# Patient Record
Sex: FEMALE | NOT HISPANIC OR LATINO | URBAN - METROPOLITAN AREA
[De-identification: names, ages, dates, MRNs, and addresses within clinical notes are randomized per-mention and may not be internally consistent; named-entity substitution may affect disease eponyms.]

---

## 2017-10-21 ENCOUNTER — IMPORTED ENCOUNTER (OUTPATIENT)
Dept: URBAN - METROPOLITAN AREA CLINIC 1 | Facility: CLINIC | Age: 47
End: 2017-10-21

## 2017-10-21 PROBLEM — H52.4: Noted: 2017-10-21

## 2017-10-21 PROBLEM — H52.13: Noted: 2017-10-21

## 2017-10-21 PROCEDURE — S0620 ROUTINE OPHTHALMOLOGICAL EXA: HCPCS

## 2017-10-21 NOTE — PATIENT DISCUSSION
1. Myopia: Rx was given for correction if indicated and requested. 2. Presbyopia: Rx was given for corrective spectacles if indicated. 3.  (Gsuspect OU (0.8 OU/ () FHX- G-mother)- Normal IOP OU. Baseline testing done in past- normal per pt. Will do our own baseline testing in 6 months w/ a HVF an OCT. )4. Return for an appointment for a 30/OCT/HVF in 6 months with Dr. Josy Blancas.

## 2018-04-27 ENCOUNTER — IMPORTED ENCOUNTER (OUTPATIENT)
Dept: URBAN - METROPOLITAN AREA CLINIC 1 | Facility: CLINIC | Age: 48
End: 2018-04-27

## 2018-04-27 PROBLEM — H25.13: Noted: 2018-04-27

## 2018-04-27 PROBLEM — H40.023: Noted: 2018-04-27

## 2018-04-27 PROCEDURE — 92014 COMPRE OPH EXAM EST PT 1/>: CPT

## 2018-04-27 PROCEDURE — 92083 EXTENDED VISUAL FIELD XM: CPT

## 2018-04-27 PROCEDURE — 92133 CPTRZD OPH DX IMG PST SGM ON: CPT

## 2018-04-27 NOTE — PATIENT DISCUSSION
1.  Glaucoma Suspect OU (0.8 OU): Stable IOP OU. Normal HVF OU. Minimal thinning by OCT OU. **Treat w/ any future progression. Patient is considered high risk. Condition was discussed with patient and patient understands. Will continue to monitor patient for any progression in condition. Patient was advised to call us with any problems questions or concerns. 2.  Cataract OU: Observe for now without intervention. The patient was advised to contact us if any change or worsening of vision3. Return as scheduled in October with Dr. Laura Hui. 4. Return for an appointment for a 30/OCT in 1 year with Dr. Laura Hui.

## 2018-04-27 NOTE — PATIENT DISCUSSION
Cataract OU: Observe for now without intervention.  The patient was advised to contact us if any change or worsening of vision

## 2018-10-20 ENCOUNTER — IMPORTED ENCOUNTER (OUTPATIENT)
Dept: URBAN - METROPOLITAN AREA CLINIC 1 | Facility: CLINIC | Age: 48
End: 2018-10-20

## 2018-10-20 PROBLEM — H52.4: Noted: 2018-10-20

## 2018-10-20 PROBLEM — H52.13: Noted: 2018-10-20

## 2018-10-20 PROCEDURE — S0621 ROUTINE OPHTHALMOLOGICAL EXA: HCPCS

## 2018-10-20 NOTE — PATIENT DISCUSSION
1. Myopia: Rx was given for correction if indicated and requested. 2. Presbyopia: Rx was given for corrective spectacles if indicated. 3.  (Glaucoma Suspect OU (0.8 OU): Stable IOP OU. HVF normal OU in past. Minimal thinning by OCT OU in past. **Treat w/ any future progression. Patient is considered high risk. )4. (Cataract OU: Observe for now without intervention. The patient was advised to contact us if any change or worsening of vision)5.  (VERENICE w/ PEK OU- Start Tears OU QID routinely)6. Return for an appointment for 40cc in 1 year with Dr. Nemesio Balderas.

## 2019-04-26 ENCOUNTER — IMPORTED ENCOUNTER (OUTPATIENT)
Dept: URBAN - METROPOLITAN AREA CLINIC 1 | Facility: CLINIC | Age: 49
End: 2019-04-26

## 2019-04-26 PROBLEM — H16.143: Noted: 2019-04-26

## 2019-04-26 PROBLEM — H04.123: Noted: 2019-04-26

## 2019-04-26 PROBLEM — H25.13: Noted: 2019-04-26

## 2019-04-26 PROBLEM — H40.023: Noted: 2019-04-26

## 2019-04-26 PROCEDURE — 92014 COMPRE OPH EXAM EST PT 1/>: CPT

## 2019-04-26 PROCEDURE — 92133 CPTRZD OPH DX IMG PST SGM ON: CPT

## 2019-04-26 NOTE — PATIENT DISCUSSION
1.  Glaucoma Suspect OU (CD: 0.80 OU) -- IOP stable today OU. Positive Family h/o Glaucoma. OCT today shows stable minimal thinning OU. **Treat w/ any future progression** Patient is considered high risk. Condition was discussed with patient and patient understands. Will continue to monitor patient for any progression in condition. Patient was advised to call us with any problems questions or concerns. 2.  VERENICE w/ PEK OU -- Recommend continue the frequent use of OTC AT's BID-QID OU Routinely. 3.  Nuclear Cataracts OU -- Observe for now without intervention. The patient was advised to contact us if any change or worsening of vision. Return for an appointment in 1 YR for a 27 / OCT OU with Dr. Jolynn Hutton. Patient defers the refraction at today's visit (done under [employer] / [employer]). Return as scheduled in October 2019 for 40 / 1795 Dr Shree Murdock [facility] appointment with Dr. Jolynn Hutton.

## 2019-10-19 ENCOUNTER — IMPORTED ENCOUNTER (OUTPATIENT)
Dept: URBAN - METROPOLITAN AREA CLINIC 1 | Facility: CLINIC | Age: 49
End: 2019-10-19

## 2019-10-19 PROBLEM — H52.13: Noted: 2019-10-19

## 2019-10-19 PROCEDURE — S0621 ROUTINE OPHTHALMOLOGICAL EXA: HCPCS

## 2019-10-19 NOTE — PATIENT DISCUSSION
1. Myopia OU- Rx for glasses & Cls given 2. Glaucoma Suspect OU (CD: 0.80 OU) -- Fm Hx F/u as scheduled for baseline w/u. 3.  VERENICE w/ PEK OU -- Cont AT's BID-QID OU Routinely. 4.  Nuclear Cataracts OU -- ObserveReturn for an appointment in as scheduled with Dr. Ernesto Omer. Return for an appointment in 1 yr 40/cc with Dr. Ernesto Omer.

## 2020-09-04 ENCOUNTER — IMPORTED ENCOUNTER (OUTPATIENT)
Dept: URBAN - METROPOLITAN AREA CLINIC 1 | Facility: CLINIC | Age: 50
End: 2020-09-04

## 2020-09-04 PROBLEM — H25.13: Noted: 2020-09-04

## 2020-09-04 PROBLEM — H40.023: Noted: 2020-09-04

## 2020-09-04 PROBLEM — H04.123: Noted: 2020-09-04

## 2020-09-04 PROBLEM — H16.143: Noted: 2020-09-04

## 2020-09-04 PROCEDURE — 92014 COMPRE OPH EXAM EST PT 1/>: CPT

## 2020-09-04 PROCEDURE — 92133 CPTRZD OPH DX IMG PST SGM ON: CPT

## 2020-09-04 NOTE — PATIENT DISCUSSION
1.  Glaucoma Suspect OU -- (CD 0.8 OU) IOP stable. OCT without progression OU. () family Hx (Maternal grandmother). Patient is considered high risk. Condition was discussed with patient and patient understands. Will continue to monitor patient for any progression in condition. Patient was advised to call us with any problems questions or concerns. 2.  VERENICE w/ PEK OU -- Cont AT's BID-QID OU Routinely. 3.  Cataract OU --  Observe for now without intervention. The patient was advised to contact us if any change or worsening of visionReturn for an appointment in Next Available for a 40/cc with Dr. Keri William. Return for an appointment in August for a 30/OCT with Dr. Sony Blandon.

## 2020-11-07 ENCOUNTER — IMPORTED ENCOUNTER (OUTPATIENT)
Dept: URBAN - METROPOLITAN AREA CLINIC 1 | Facility: CLINIC | Age: 50
End: 2020-11-07

## 2020-11-07 PROBLEM — H52.4: Noted: 2020-11-07

## 2020-11-07 PROBLEM — H52.223: Noted: 2020-11-07

## 2020-11-07 PROBLEM — H52.13: Noted: 2020-11-07

## 2020-11-07 PROCEDURE — S0621 ROUTINE OPHTHALMOLOGICAL EXA: HCPCS

## 2020-11-07 NOTE — PATIENT DISCUSSION
1. Myopia w/ Astigmatism OS -- Rx was given for correction if indicated and requested. 2. Presbyopia 3. Nuclear Cataracts OU -- Observe. 4.  VERENICE w/ PEK OU -- Cont the use of ATs BID OU routinely. 5.  Glaucoma Suspect OU -- (C/D: 0.80 OU) IOP stable today. Finalized CTL Rx today and given to patient. Return for an appointment for Return as scheduled with Dr. Bella Reyes. Return for an appointment in 1 year 40/cc with Dr. Kenyatta Swartz.

## 2021-08-27 ENCOUNTER — IMPORTED ENCOUNTER (OUTPATIENT)
Dept: URBAN - METROPOLITAN AREA CLINIC 1 | Facility: CLINIC | Age: 51
End: 2021-08-27

## 2021-08-27 PROBLEM — H25.13: Noted: 2021-08-27

## 2021-08-27 PROBLEM — H40.023: Noted: 2021-08-27

## 2021-08-27 PROBLEM — H16.143: Noted: 2021-08-27

## 2021-08-27 PROBLEM — H04.123: Noted: 2021-08-27

## 2021-08-27 PROCEDURE — 92014 COMPRE OPH EXAM EST PT 1/>: CPT

## 2021-08-27 PROCEDURE — 92133 CPTRZD OPH DX IMG PST SGM ON: CPT

## 2021-08-27 NOTE — PATIENT DISCUSSION
1.  Glaucoma Suspect OU :(C/D 0.8 OU) Patient is considered high risk. OCT stable. FH. Condition was discussed with patient and patient understands. Will continue to monitor patient for any progression in condition. Patient was advised to call us with any problems questions or concerns. 2.  VERENICE w/ PEK OU-The use/continuation of artificial tears were recommended. 3.  Cataract OU: Observe for now without intervention. The patient was advised to contact us if any change or worsening of vision4. Return for an appointment in 1 year for 30 OCT VF with Dr. Minnie Tyler.

## 2021-11-12 ENCOUNTER — IMPORTED ENCOUNTER (OUTPATIENT)
Dept: URBAN - METROPOLITAN AREA CLINIC 1 | Facility: CLINIC | Age: 51
End: 2021-11-12

## 2021-11-12 PROBLEM — H52.4: Noted: 2021-11-12

## 2021-11-12 PROBLEM — H52.13: Noted: 2021-11-12

## 2021-11-12 PROBLEM — H52.222: Noted: 2021-11-12

## 2021-11-12 PROCEDURE — S0621 ROUTINE OPHTHALMOLOGICAL EXA: HCPCS

## 2021-11-12 NOTE — PATIENT DISCUSSION
1. Myopia w/ Astigmatism OS -- Rx was given for correction if indicated and requested. 2. Presbyopia 3. Nuclear Cataract OU -- Observe. 4.  Glaucoma Suspect OU -- (CD: 0.80 OU) IOP stable OU. () Family Hx. Patient is considered High Risk. 5.  VERENICE w/ PEK OU -- Cont ATs TID OU routinely. Finalized CTL Rx and given to patient ([employer]). *DVO w/ OTC Readers. Return for an appointment as scheduled (8.26.22 - 30/OCT/VF) with Dr. Susana Miller. Return for an appointment in 1 year 40/cc with Dr. Amina Ford.

## 2022-04-02 ASSESSMENT — KERATOMETRY
OS_K2POWER_DIOPTERS: 44.00
OD_AXISANGLE2_DEGREES: 090
OD_K2POWER_DIOPTERS: 43.75
OS_AXISANGLE2_DEGREES: 072
OS_AXISANGLE_DEGREES: 162
OD_K1POWER_DIOPTERS: 43.75
OS_K1POWER_DIOPTERS: 43.75
OD_AXISANGLE_DEGREES: 090

## 2022-04-02 ASSESSMENT — TONOMETRY
OS_IOP_MMHG: 18
OS_IOP_MMHG: 18
OD_IOP_MMHG: 18
OS_IOP_MMHG: 14
OD_IOP_MMHG: 17
OS_IOP_MMHG: 17
OD_IOP_MMHG: 15
OS_IOP_MMHG: 18
OS_IOP_MMHG: 17
OD_IOP_MMHG: 14
OD_IOP_MMHG: 16
OD_IOP_MMHG: 18
OD_IOP_MMHG: 17
OS_IOP_MMHG: 15
OS_IOP_MMHG: 17
OD_IOP_MMHG: 18
OD_IOP_MMHG: 17
OS_IOP_MMHG: 18

## 2022-04-02 ASSESSMENT — VISUAL ACUITY
OS_CC: J2
OD_SC: 20/20
OD_SC: 20/20
OS_SC: 20/20
OD_CC: J1+
OS_SC: 20/20
OS_SC: 20/20
OD_SC: 20/20
OS_SC: 20/20
OD_SC: 20/20
OS_SC: 20/20
OD_SC: 20/20
OS_SC: 20/20
OS_SC: 20/20
OD_SC: 20/20
OD_SC: 20/20
OS_SC: 20/20
OS_SC: 20/20
OS_CC: J1+
OD_CC: J2

## 2022-10-25 ENCOUNTER — ESTABLISHED PATIENT (OUTPATIENT)
Dept: URBAN - METROPOLITAN AREA CLINIC 1 | Facility: CLINIC | Age: 52
End: 2022-10-25

## 2022-10-25 VITALS — BODY MASS INDEX: 23.3 KG/M2 | HEIGHT: 66 IN | WEIGHT: 145 LBS

## 2022-10-25 DIAGNOSIS — H04.123: ICD-10-CM

## 2022-10-25 DIAGNOSIS — H25.813: ICD-10-CM

## 2022-10-25 DIAGNOSIS — H40.023: ICD-10-CM

## 2022-10-25 DIAGNOSIS — H16.143: ICD-10-CM

## 2022-10-25 PROCEDURE — 92133 CPTRZD OPH DX IMG PST SGM ON: CPT

## 2022-10-25 PROCEDURE — 92083 EXTENDED VISUAL FIELD XM: CPT

## 2022-10-25 PROCEDURE — 92014 COMPRE OPH EXAM EST PT 1/>: CPT

## 2022-10-25 ASSESSMENT — TONOMETRY
OD_IOP_MMHG: 16
OS_IOP_MMHG: 16

## 2022-10-25 ASSESSMENT — VISUAL ACUITY
OS_CC: 20/20
OD_CC: 20/20

## 2022-10-25 NOTE — PATIENT DISCUSSION
(CD 0.80 OU) OCT shows mild RNFL thinning OU. HVF essentially WNL OU. IOP stable. +Family hx (Grandmother). Patient is considered high risk. Condition was discussed with patient and patient understands. Will continue to monitor patient for any progression in condition. Patient was advised to call us with any problems, questions, or concerns.

## 2022-11-18 ENCOUNTER — COMPREHENSIVE EXAM (OUTPATIENT)
Dept: URBAN - METROPOLITAN AREA CLINIC 1 | Facility: CLINIC | Age: 52
End: 2022-11-18

## 2022-11-18 DIAGNOSIS — H52.13: ICD-10-CM

## 2022-11-18 DIAGNOSIS — H52.4: ICD-10-CM

## 2022-11-18 DIAGNOSIS — Z01.00: ICD-10-CM

## 2022-11-18 PROCEDURE — 92015 DETERMINE REFRACTIVE STATE: CPT

## 2022-11-18 PROCEDURE — 92014 COMPRE OPH EXAM EST PT 1/>: CPT

## 2022-11-18 PROCEDURE — 92310 CONTACT LENS FITTING OU: CPT

## 2022-11-18 ASSESSMENT — VISUAL ACUITY
OS_CC: J3
OD_CC: 20/20
OS_CC: 20/20
OD_CC: J3

## 2022-11-18 NOTE — PATIENT DISCUSSION
(CD 0.80 OU) IOP stable. +Family hx (Grandmother). Patient is considered high risk. Condition was discussed with patient and patient understands. Will continue to monitor patient for any progression in condition. Patient was advised to call us with any problems, questions, or concerns.

## 2023-11-22 ENCOUNTER — COMPREHENSIVE EXAM (OUTPATIENT)
Dept: URBAN - METROPOLITAN AREA CLINIC 1 | Facility: CLINIC | Age: 53
End: 2023-11-22

## 2023-11-22 DIAGNOSIS — Z01.00: ICD-10-CM

## 2023-11-22 DIAGNOSIS — Z46.0: ICD-10-CM

## 2023-11-22 PROCEDURE — 92014 COMPRE OPH EXAM EST PT 1/>: CPT

## 2023-11-22 PROCEDURE — 92310-2 LEVEL 2 CONTACT LENS MANAGEMENT

## 2023-11-22 PROCEDURE — 92015 DETERMINE REFRACTIVE STATE: CPT

## 2023-11-22 ASSESSMENT — TONOMETRY
OS_IOP_MMHG: 15
OD_IOP_MMHG: 15

## 2023-11-22 ASSESSMENT — VISUAL ACUITY
OS_CC: 20/20
OD_CC: J16
OD_CC: 20/25
OS_CC: J7

## 2023-12-14 ENCOUNTER — FOLLOW UP (OUTPATIENT)
Dept: URBAN - METROPOLITAN AREA CLINIC 1 | Facility: CLINIC | Age: 53
End: 2023-12-14

## 2023-12-14 DIAGNOSIS — Z46.0: ICD-10-CM

## 2023-12-14 PROCEDURE — 92310-F CONTACT LENS FITTING FOLLOW UP

## 2024-01-29 ENCOUNTER — COMPREHENSIVE EXAM (OUTPATIENT)
Dept: URBAN - METROPOLITAN AREA CLINIC 1 | Facility: CLINIC | Age: 54
End: 2024-01-29

## 2024-01-29 DIAGNOSIS — H40.023: ICD-10-CM

## 2024-01-29 DIAGNOSIS — H25.813: ICD-10-CM

## 2024-01-29 DIAGNOSIS — H04.123: ICD-10-CM

## 2024-01-29 PROCEDURE — 92014 COMPRE OPH EXAM EST PT 1/>: CPT

## 2024-01-29 PROCEDURE — 92133 CPTRZD OPH DX IMG PST SGM ON: CPT

## 2024-01-29 ASSESSMENT — VISUAL ACUITY
OD_CC: 20/20
OS_CC: 20/20

## 2024-01-29 ASSESSMENT — TONOMETRY
OS_IOP_MMHG: 14
OD_IOP_MMHG: 14

## 2024-08-26 ENCOUNTER — HOSPITAL ENCOUNTER (OUTPATIENT)
Facility: HOSPITAL | Age: 54
Setting detail: SPECIMEN
Discharge: HOME | End: 2024-08-29

## 2024-08-26 PROCEDURE — 88305 TISSUE EXAM BY PATHOLOGIST: CPT

## 2024-11-26 ENCOUNTER — COMPREHENSIVE EXAM (OUTPATIENT)
Dept: URBAN - METROPOLITAN AREA CLINIC 1 | Facility: CLINIC | Age: 54
End: 2024-11-26

## 2024-11-26 DIAGNOSIS — Z01.00: ICD-10-CM

## 2024-11-26 DIAGNOSIS — H52.222: ICD-10-CM

## 2024-11-26 DIAGNOSIS — Z46.0: ICD-10-CM

## 2024-11-26 DIAGNOSIS — H52.13: ICD-10-CM

## 2024-11-26 DIAGNOSIS — H52.4: ICD-10-CM

## 2024-11-26 PROCEDURE — 92310-3 LEVEL 3 SOFT LENS UPDATE

## 2024-11-26 PROCEDURE — 92014 COMPRE OPH EXAM EST PT 1/>: CPT

## 2024-11-26 PROCEDURE — 92015 DETERMINE REFRACTIVE STATE: CPT
